# Patient Record
Sex: FEMALE | Race: WHITE
[De-identification: names, ages, dates, MRNs, and addresses within clinical notes are randomized per-mention and may not be internally consistent; named-entity substitution may affect disease eponyms.]

---

## 2019-04-29 NOTE — XRAY REPORT
Reason:  LOCALIZED SWELLING,MASS AND LUMP,LEFT UPPER LIMB

Procedure Date:  04/29/2019   

Accession Number:  [id number] / [id number]                    

Procedure:  XR  - Hand 3 View LT CPT Code:  

 

FULL RESULT:

 

 

EXAM:

LEFT HAND RADIOGRAPHY

 

EXAM DATE: 4/29/2019 10:18 AM.

 

CLINICAL HISTORY: LOCALIZED Swelling, mass AND LUMP, LEFT UPPER LIMB.

 

COMPARISON: None.

 

TECHNIQUE: 3 views.

 

FINDINGS:

Bones: Normal. No fractures or bone lesions.

 

Joints: Normal. No subluxations.

 

Soft Tissues: Normal. No soft tissue swelling.

IMPRESSION: Normal hand radiography. No osseous abnormality demonstrated.

 

[facility]

## 2020-10-16 NOTE — CT REPORT
PROCEDURE:  Abdomen/Pelvis W

 

INDICATIONS:  UMBICAL HERNIA W/O OBST OR GANGRENE

 

CONTRAST:  IV CONTRAST: Optiray 320 ml: 100 PO CONTRAST: Optiray 320 ml50

 

TECHNIQUE:  

After the administration of intravenous contrast, 5 mm thick sections acquired from the diaphragms to
 the symphysis.  5 mm thick coronal and sagittal reformats were acquired.  For radiation dose reducti
on, the following was used:  automated exposure control, adjustment of mA and/or kV according to clemencia
ent size.  

 

COMPARISON:  None.

 

FINDINGS:  

Image quality:  Excellent.  

 

ABDOMEN:  

Lung bases: Lung bases are clear. Heart size is normal. Partially visualized moderate to severe pectu
s excavatum deformity.

 

Abdominal wall: There is a small midline supra umbilical hernia on series 3 image 35 with a 1.7 cm fa
scial defect. The hernia sac contains preperitoneal fat along with probably some intraperitoneal fat 
just anterior to the inferior margin of the left hepatic lobe at this location. No bowel within the h
ernia. There is an additional pain fat containing umbilical hernia with a subcentimeter fascial defec
t.

 

Solid organs: Normal CT appearance of the liver, gallbladder, pancreas, spleen, adrenal glands, and k
idneys.

 

Peritoneum and bowel:  Bowel loops demonstrate normal wall thickness and caliber.  No free fluid or a
ir.  

 

Nodes and vessels:  No retroperitoneal or mesenteric adenopathy by size criteria.  Aorta and inferior
 vena cava are normal in size.  

 

Miscellaneous:  No ventral hernias.  

 

 

PELVIS:  

Genitourinary:  Bladder wall thickness is normal.  IUD noted. Uterus and ovaries are otherwise normal
.

 

Miscellaneous:  No inguinal hernias or adenopathy.  

 

Bones:  No suspicious bony lesions.  No vertebral body compression fractures.  

 

IMPRESSION: 

 

Small fat-containing umbilical hernia.

 

Tiny fat-containing umbilical hernia.

 

Reviewed by: Phuc Graham MD on 10/16/2020 3:58 PM PDT

Approved by: Phuc Graham MD on 10/16/2020 3:58 PM PDT

 

 

Station ID:  [id number]

## 2020-11-12 ENCOUNTER — HOSPITAL ENCOUNTER (OUTPATIENT)
Dept: HOSPITAL 76 - COV | Age: 45
Discharge: HOME | End: 2020-11-12
Attending: SURGERY
Payer: COMMERCIAL

## 2020-11-12 DIAGNOSIS — Z01.812: Primary | ICD-10-CM

## 2020-11-12 DIAGNOSIS — Z20.828: ICD-10-CM

## 2020-11-12 DIAGNOSIS — K43.6: ICD-10-CM

## 2020-11-16 ENCOUNTER — HOSPITAL ENCOUNTER (OUTPATIENT)
Dept: HOSPITAL 76 - SDS | Age: 45
Discharge: HOME | End: 2020-11-16
Attending: SURGERY
Payer: COMMERCIAL

## 2020-11-16 VITALS — DIASTOLIC BLOOD PRESSURE: 74 MMHG | SYSTOLIC BLOOD PRESSURE: 110 MMHG

## 2020-11-16 DIAGNOSIS — K43.6: Primary | ICD-10-CM

## 2020-11-16 DIAGNOSIS — I10: ICD-10-CM

## 2020-11-16 LAB
HCG UR QL: NEGATIVE
SP GR UR STRIP.AUTO: >=1.03 (ref 1–1.03)

## 2020-11-16 PROCEDURE — 49568: CPT

## 2020-11-16 PROCEDURE — 81025 URINE PREGNANCY TEST: CPT

## 2020-11-16 PROCEDURE — 49561: CPT

## 2020-11-16 NOTE — ANESTHESIA POST OP EVALUATION
Anesthesia Post Eval





- Post Anesthesia Eval


Vitals: 





                                Last Vital Signs











Temp  37.1 C   11/16/20 10:00


 


Pulse  67   11/16/20 10:00


 


Resp  15   11/16/20 10:00


 


BP  110/74   11/16/20 10:00


 


Pulse Ox  99   11/16/20 10:00











CV Function Including HR & BP: positive: Stable


Pain Control: positive: Satisfactory


Nausea & Vomiting: positive: Negative


Mental Status: positive: Baseline


Respiratory Status: Airway Patent


Hydration Status: Satisfactory


Anesthesia Complications: positive: None

## 2020-11-16 NOTE — ANESTHESIA
Pre-Anesthesia VS, & Labs





- Diagnosis





ventral hernia





- Procedure





ventral hernia repair


Vital Signs: 





                                        











Temp Pulse Resp BP Pulse Ox


 


 36.4 C L  83   16   128/89 H  100 


 


 20 06:46  20 06:46  20 06:46  20 06:46  20 06:46











Height: 5 ft 4 in


Weight (kg): 57.7 kg


Body Mass Index: 21.8


BMI Classification: Healthy weight





- NPO


>8 hours





- Pregnancy


Is Patient Pregnant?: No





Home Medications and Allergies


Home Medications: 


Ambulatory Orders





Cholecalciferol (Vitamin D3) [Vitamin D3] 1,000 unit PO DAILY 20 


Omega-3/Dha/Epa/Fish Oil [Fish Oil 1,000 mg Softgel] 1 each PO DAILY 20 











                                        





Cholecalciferol (Vitamin D3) [Vitamin D3] 1,000 unit PO DAILY 20 


Omega-3/Dha/Epa/Fish Oil [Fish Oil 1,000 mg Softgel] 1 each PO DAILY 20 








Allergies/Adverse Reactions: 


                                    Allergies











Allergy/AdvReac Type Severity Reaction Status Date / Time


 


erythromycin base AdvReac  stomach Verified 20 10:55





   cramps  














Anes History & Medical History





- Anesthetic History


Anesthesia Complications: reports: No previous complications





- Medical History


Cardiovascular: reports: None


Pulmonary: reports: None


Gastrointestinal: reports: None


Urinary: reports: None


Neuro: reports: None


Musculoskeletal: reports: None


Endocrine/Autoimmune: reports: None


Blood Disorders: reports: None


Skin: reports: Eczema


Smoking Status: Never smoker


Psychosocial: reports: Alcohol (Glass of wine at night 3 times per week)


History of Cancer?: No





- Surgical History


Gynecologic:  section, Other (breast biopsy)





Exam


General: Alert, Oriented x3, Cooperative, No acute distress


Dental: WNL


Mouth Opening: 3 Fingerbreadth


Neck Mobility: Normal


Mallampati classification: I


Thyromental Distance: 4-6 cm


Mental/Cognitive Status: Alert/Oriented X3, Normal for patient





Plan


Anesthesia Type: General


Consent for Procedure(s) Verified and Reviewed: Yes


Code Status: Attempt Resuscitation


ASA classification: 1-Healthy patient


Is this case an emergency?: No

## 2020-11-16 NOTE — OPERATIVE REPORT
Operative Report





- General


Procedure Date: 11/16/20


Planned Procedure: 





Ventral hernia repair


Pre-Op Diagnosis: Incarcerated ventral hernia


Procedure Performed: 





Ventral hernia repair with direct repair and onlay patch


Post Op Diagnosis: Incarcerated ventral hernia





- Procedure Note


Primary Surgeon: Jerson


Anesthesia Provider: CELESTINE Rodgers


Anesthesia Technique: General LMA


IV Fluids (mL): 700


Estimated Blood Loss (mL): 5


Findings: 





1cm defect containing preperitoneal fat.  Hernia sack was 6 cm


Complications: 





None apparent





- Other


Other Information/Narrative: 





After obtaining informed consent, the patient is brought to the operating room 

and placed in the supine position on the operating table.  Following successful 

induction of general anesthesia, appropriate padding of all bony prominences, 

and placement of appropriate monitors, the abdomen was prepped and draped in the

standard surgical fashion.  A timeout was held per scope protocol.  All elements

of the surgical safety checklist were followed before, during, and after the 

procedure.


We began the procedure by infiltrating a mixture of local anesthetics in the 

upper midline at the site of the palpable mass.  An incision was then created in

the skin and carried through into the subcutaneous tissue where we were able to 

visualize a 6 cm hernia sac.  I consisted of preperitoneal fat.  This was 

carefully retracted in each direction to give access to the central neck of the 

hernia which was only approximately 1 cm in greatest dimension.  The edges of 

the fascia were cleared and this portion of preperitoneal fat eased back into 

the preperitoneal space.  The wound was checked for hemostasis and the fascial 

edges carefully defined.  Again, the opening was only 1 cm.  The patient had 

expressed an absolute desire not to have any intraperitoneal mesh.  For this 

reason I elected to perform a direct repair buttressed with an onlay patch.  The

1 cm opening was closed with 0 Prolene suture.  Woven Prolene mesh was then 

dipped in Ancef containing solution.  A 2 x 4 cm patch was then placed on top of

the hernia defect directly on the fascia.  It was sewn to the fascia with 

interrupted 0 Prolene sutures and then sewn to the direct repair.  The wound was

checked for hemostasis.  It was irrigated with warm saline solution containing 

Ancef and then aspirated free of all fluid and particulate matter.  The 

subcutaneous tissue was approximated with Vicryl and Monocryl stitches were 

placed in the skin.  All sponge, needle, and instrument counts were correct at 

the conclusion of the case.  The patient was allowed to wake from anesthesia 

without difficulty and taken to the post anesthesia care unit in good condition.

## 2021-07-26 ENCOUNTER — HOSPITAL ENCOUNTER (OUTPATIENT)
Dept: HOSPITAL 76 - DI | Age: 46
Discharge: HOME | End: 2021-07-26
Attending: NURSE PRACTITIONER
Payer: COMMERCIAL

## 2021-07-26 DIAGNOSIS — N63.21: Primary | ICD-10-CM

## 2021-07-27 NOTE — ULTRASOUND REPORT
LIMITED ULTRASOUND OF LEFT BREAST: 7/26/2021

CLINICAL: Palpable left breast lump.  

 

Comparison is made to exams dated:  7/26/2021 mammogram - [facility] and 8/30/2010 
mammogram - [facility].  

 Color flow ultrasound of the left breast 2 o'clock region was performed.  Gray scale images of the r
eal-time examination were reviewed.  

 

Dense fibroglandular tissue but no mass is identifed in the patient-indicated palpable area of concer
n.  

 

IMPRESSION: NEGATIVE 

Dense fibroglandular tissue but no mass is identifed in the patient-indicated palpable area of concer
n. Recommend clinical follow up.  

There is no sonographic evidence of malignancy.  

A 1 year screening mammogram is recommended.   

 

This exam was interpreted at Station ID: [id number].  

Electronically Signed By: Wily alvares/german:7/26/2021 14:33:59  

 

 

 

Ultrasound BI-RADS: 1 Negative

BI-RADS CATEGORY: (1) - 1

RECOMMENDATION: (ANNUAL)  - Recommend routine annual screening mammography.

[id number]

1 year screening

LATERALITY: (B)

## 2021-07-27 NOTE — MAMMOGRAPHY REPORT
BILATERAL DIGITAL DIAGNOSTIC MAMMOGRAM 3D/2D: 7/26/2021

 

CLINICAL: Palpable left breast lump.  

 

Comparison is made to exam dated:  8/30/2010 mammogram - [facility].  The tissue o
f both breasts is heterogeneously dense. This may lower the sensitivity of mammography.  

 

No significant masses, calcifications, or other findings are seen in either breast.  

 

IMPRESSION: INCOMPLETE: NEEDS ADDITIONAL IMAGING EVALUATION

There is no abnormality seen in the left breast to correspond with the palpable abnormality in the up
per outer quadrant.  Targeted ultrasound is recommended for further evaluation, which will be schedul
ed immediately following this exam.  

 

 

 

 

This exam was interpreted at Station ID: [id number].  

 

NOTE: For mammograms, a report in lay terms will be sent to the patient. Approximately 15% of breast 
malignancies will not be visualized mammographically. In the management of a palpable breast mass, a 
negative mammogram must not discourage biopsy of a clinically suspicious lesion.

 

Electronically Signed By: Wily alvares/german:7/26/2021 14:32:58  

 

 

 

ACR BI-RADS Category 0: Incomplete [id number]

PARENCHYMAL PATTERN: (D) - The breast(s) demonstrate(s) heterogeneously dense fibroglandular jack grant.

BI-RADS CATEGORY: (0) - 0

Ultrasound

[id number]

Immediate follow-up

LATERALITY: (L)

## 2023-10-17 ENCOUNTER — HOSPITAL ENCOUNTER (OUTPATIENT)
Dept: HOSPITAL 76 - DI.S | Age: 48
Discharge: HOME | End: 2023-10-17
Payer: COMMERCIAL

## 2023-10-17 DIAGNOSIS — M25.551: Primary | ICD-10-CM

## 2023-10-17 NOTE — XRAY REPORT
PROCEDURE:  Hip w/Pelvis 2-3V RT

 

INDICATIONS:  PAIN IN RIGHT HIP JOINT

 

TECHNIQUE:  AP pelvis with lateral view(s) of the right hip(s).  

 

COMPARISON:  None.

 

FINDINGS:  

 

Bones:  No fractures or dislocations.  No suspicious bony lesions.   

 

Soft tissues:  No suspicious soft tissue calcifications or masses. IUD projects over the pelvic inlet
.

 

 

IMPRESSION:  

No acute bony abnormality. No significant hip joint osteoarthritis.

 

 

 

Reviewed by: Lior Yu on 10/17/2023 1:33 PM PDT

Approved by: Lior Yu on 10/17/2023 1:33 PM PDT

 

 

Station ID:  [id number]